# Patient Record
Sex: MALE | Race: WHITE | Employment: OTHER | URBAN - METROPOLITAN AREA
[De-identification: names, ages, dates, MRNs, and addresses within clinical notes are randomized per-mention and may not be internally consistent; named-entity substitution may affect disease eponyms.]

---

## 2017-09-29 ENCOUNTER — OFFICE VISIT (OUTPATIENT)
Dept: FAMILY MEDICINE CLINIC | Facility: CLINIC | Age: 57
End: 2017-09-29

## 2017-09-29 VITALS
HEART RATE: 104 BPM | HEIGHT: 71 IN | WEIGHT: 254 LBS | OXYGEN SATURATION: 96 % | RESPIRATION RATE: 18 BRPM | TEMPERATURE: 99 F | DIASTOLIC BLOOD PRESSURE: 90 MMHG | BODY MASS INDEX: 35.56 KG/M2 | SYSTOLIC BLOOD PRESSURE: 150 MMHG

## 2017-09-29 DIAGNOSIS — M25.512 ACUTE PAIN OF BOTH SHOULDERS: ICD-10-CM

## 2017-09-29 DIAGNOSIS — R07.9 CHEST PAIN IN ADULT: ICD-10-CM

## 2017-09-29 DIAGNOSIS — M25.511 ACUTE PAIN OF BOTH SHOULDERS: ICD-10-CM

## 2017-09-29 DIAGNOSIS — M54.50 ACUTE BILATERAL LOW BACK PAIN WITHOUT SCIATICA: ICD-10-CM

## 2017-09-29 DIAGNOSIS — S09.90XD HEAD TRAUMA, SUBSEQUENT ENCOUNTER: Primary | ICD-10-CM

## 2017-09-29 DIAGNOSIS — M54.2 NECK PAIN: ICD-10-CM

## 2017-09-29 DIAGNOSIS — M25.521 ELBOW PAIN, RIGHT: ICD-10-CM

## 2017-09-29 DIAGNOSIS — J45.20 MILD INTERMITTENT ASTHMA WITHOUT COMPLICATION: ICD-10-CM

## 2017-09-29 DIAGNOSIS — R07.0 THROAT PAIN IN ADULT: ICD-10-CM

## 2017-09-29 PROCEDURE — 99204 OFFICE O/P NEW MOD 45 MIN: CPT | Performed by: FAMILY MEDICINE

## 2017-09-29 RX ORDER — FLUTICASONE PROPIONATE AND SALMETEROL 100; 50 UG/1; UG/1
1 POWDER RESPIRATORY (INHALATION) 2 TIMES DAILY
COMMUNITY
End: 2017-09-29

## 2017-09-29 RX ORDER — FLUTICASONE PROPIONATE AND SALMETEROL 100; 50 UG/1; UG/1
1 POWDER RESPIRATORY (INHALATION) 2 TIMES DAILY
Qty: 60 EACH | Refills: 3 | Status: SHIPPED | OUTPATIENT
Start: 2017-09-29 | End: 2018-03-23
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
## 2017-11-14 ENCOUNTER — OFFICE VISIT (OUTPATIENT)
Dept: FAMILY MEDICINE CLINIC | Facility: CLINIC | Age: 57
End: 2017-11-14

## 2017-11-14 VITALS
SYSTOLIC BLOOD PRESSURE: 160 MMHG | HEART RATE: 102 BPM | HEIGHT: 71 IN | WEIGHT: 256 LBS | DIASTOLIC BLOOD PRESSURE: 80 MMHG | BODY MASS INDEX: 35.84 KG/M2 | OXYGEN SATURATION: 98 %

## 2017-11-14 DIAGNOSIS — S09.90XD CLOSED HEAD INJURY, SUBSEQUENT ENCOUNTER: Primary | ICD-10-CM

## 2017-11-14 DIAGNOSIS — M25.511 CHRONIC PAIN OF BOTH SHOULDERS: ICD-10-CM

## 2017-11-14 DIAGNOSIS — M54.50 CHRONIC BILATERAL LOW BACK PAIN WITHOUT SCIATICA: ICD-10-CM

## 2017-11-14 DIAGNOSIS — M54.2 NECK PAIN: ICD-10-CM

## 2017-11-14 DIAGNOSIS — M25.512 CHRONIC PAIN OF BOTH SHOULDERS: ICD-10-CM

## 2017-11-14 DIAGNOSIS — G89.29 CHRONIC PAIN OF BOTH SHOULDERS: ICD-10-CM

## 2017-11-14 DIAGNOSIS — G89.29 CHRONIC CHEST WALL PAIN: ICD-10-CM

## 2017-11-14 DIAGNOSIS — R03.0 ELEVATED BLOOD PRESSURE READING WITHOUT DIAGNOSIS OF HYPERTENSION: ICD-10-CM

## 2017-11-14 DIAGNOSIS — R07.89 CHRONIC CHEST WALL PAIN: ICD-10-CM

## 2017-11-14 DIAGNOSIS — M25.521 RIGHT ELBOW PAIN: ICD-10-CM

## 2017-11-14 DIAGNOSIS — G89.29 CHRONIC BILATERAL LOW BACK PAIN WITHOUT SCIATICA: ICD-10-CM

## 2017-11-14 PROCEDURE — 99213 OFFICE O/P EST LOW 20 MIN: CPT | Performed by: FAMILY MEDICINE

## 2017-11-14 RX ORDER — LITHIUM CARBONATE 300 MG/1
300 TABLET, FILM COATED, EXTENDED RELEASE ORAL
Refills: 2 | COMMUNITY
Start: 2017-09-21
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
## 2017-11-16 NOTE — TELEPHONE ENCOUNTER
Error on first note, Ct scan referral to be extended, not MRI. Rona Morfin notified that pt would like the referral for CT to be extended to Dec 17. Pt notified.

## 2017-11-16 NOTE — TELEPHONE ENCOUNTER
Yamilka Johnston with [facility] notified that pt is unable to go in for MRI until after the holidays, and that a new order was placed by Dr. Andrew Amezcua. Raegan Morrison will restart the approval process. Left message for pt regarding this and to call back if questions.

## 2017-11-16 NOTE — TELEPHONE ENCOUNTER
Please extend order for pt. Pt states mri will  tomorrow and is unable to go in until after holiday. pt is requesting to speak with Dr. Roberto Pederson. Please fax to [facility].

## 2017-12-04 NOTE — TELEPHONE ENCOUNTER
Patient left message on service asking for orders for physical therapy and also need doctor signature for paper work.

## 2017-12-06 NOTE — TELEPHONE ENCOUNTER
Please fax referral to ENT Dr. Cecil Keenan at [facility]. Pt has appointment for December 21 at 10:45 am fax # [phone] clinic D ENT office.      Also left message with service regarding referral if we can please change order from occupational th

## 2017-12-07 NOTE — TELEPHONE ENCOUNTER
Changed ENT referral to [facility] per patient request. Referral pending for authorization. Faxed occupational therapy and physical therapy to [address] with fax again.

## 2017-12-08 NOTE — TELEPHONE ENCOUNTER
CALLING NEED REFERRALS FOR CT SCAN AND ENT TO BE EXTENDED TO LATE MARCH EARLY April DUE TO HEALTH CONDITION CANT BE OUT IN WEATHER. PLEASE CALL WHEN COMPLETE.

## 2017-12-13 NOTE — TELEPHONE ENCOUNTER
Referral order prescription to get a ENT exam for a Dr. Elida Gamboa and he is with the [facility].

## 2018-01-26 ENCOUNTER — TELEPHONE (OUTPATIENT)
Dept: OBGYN CLINIC | Facility: CLINIC | Age: 58
End: 2018-01-26

## 2018-01-26 NOTE — TELEPHONE ENCOUNTER
Dimitri Decker is calling for a new physical therapy referral. States [facility]. Requesting right elbow, neck, lumbar back and bilateral chest. Please mail referral to pt.

## 2018-02-02 ENCOUNTER — TELEPHONE (OUTPATIENT)
Dept: FAMILY MEDICINE CLINIC | Facility: CLINIC | Age: 58
End: 2018-02-02

## 2018-02-02 DIAGNOSIS — M99.08: ICD-10-CM

## 2018-02-02 DIAGNOSIS — M54.50 CHRONIC BILATERAL LOW BACK PAIN WITHOUT SCIATICA: ICD-10-CM

## 2018-02-02 DIAGNOSIS — G89.29 CHRONIC BILATERAL LOW BACK PAIN WITHOUT SCIATICA: ICD-10-CM

## 2018-02-02 DIAGNOSIS — M54.2 NECK PAIN: ICD-10-CM

## 2018-02-02 DIAGNOSIS — M25.521 RIGHT ELBOW PAIN: Primary | ICD-10-CM

## 2018-02-02 NOTE — TELEPHONE ENCOUNTER
Patient is requesting to go to [facility]. Requesting right elbow, neck, lumbar back and bilateral chest. Please mail referral to pt.

## 2018-02-07 RX ORDER — AZELASTINE HYDROCHLORIDE 0.5 MG/ML
1 SOLUTION/ DROPS OPHTHALMIC 2 TIMES DAILY
Qty: 1 BOTTLE | Refills: 2 | Status: SHIPPED | OUTPATIENT
Start: 2018-02-07 | End: 2018-02-16

## 2018-02-07 NOTE — TELEPHONE ENCOUNTER
Requesting [facility] on alistine [facility] in [place]. Pt also upset no one returned his call regarding his referral, states he also requested to have referral mailed to his house and never received it.

## 2018-02-16 ENCOUNTER — TELEPHONE (OUTPATIENT)
Dept: FAMILY MEDICINE CLINIC | Facility: CLINIC | Age: 58
End: 2018-02-16

## 2018-02-16 DIAGNOSIS — M54.2 BILATERAL NECK PAIN: ICD-10-CM

## 2018-02-16 DIAGNOSIS — M25.511 CHRONIC PAIN OF BOTH SHOULDERS: ICD-10-CM

## 2018-02-16 DIAGNOSIS — M54.50 CHRONIC BILATERAL LOW BACK PAIN WITHOUT SCIATICA: ICD-10-CM

## 2018-02-16 DIAGNOSIS — G89.29 CHRONIC BILATERAL LOW BACK PAIN WITHOUT SCIATICA: ICD-10-CM

## 2018-02-16 DIAGNOSIS — G89.29 CHRONIC PAIN OF BOTH SHOULDERS: ICD-10-CM

## 2018-02-16 DIAGNOSIS — M25.512 CHRONIC PAIN OF BOTH SHOULDERS: ICD-10-CM

## 2018-02-16 DIAGNOSIS — M25.521 RIGHT ELBOW PAIN: ICD-10-CM

## 2018-02-16 DIAGNOSIS — M99.08: Primary | ICD-10-CM

## 2018-02-16 RX ORDER — AZELASTINE HYDROCHLORIDE 0.5 MG/ML
1 SOLUTION/ DROPS OPHTHALMIC 2 TIMES DAILY
Qty: 5 BOTTLE | Refills: 2 | Status: SHIPPED | OUTPATIENT
Start: 2018-02-16 | End: 2018-03-23

## 2018-02-16 NOTE — TELEPHONE ENCOUNTER
PATIENT WANTS TO  ORDER FOR PHYSICAL THERAPY TODAY.   ALSO WANTS ALLERGY DROP CALLED AZELASTINE MORE THAN ONLY 1.

## 2018-02-20 NOTE — TELEPHONE ENCOUNTER
Harish calling from [facility] to give Dr. Nasima Rajan an Vazquez  that Mr. Kermit Duncan has been discharge from their practice for erratic behavior. Matt Dry state Michela Steel cussed at the front staff and requested a young female therapist.Harish also said this was Damián's seco

## 2018-03-14 ENCOUNTER — TELEPHONE (OUTPATIENT)
Dept: FAMILY MEDICINE CLINIC | Facility: CLINIC | Age: 58
End: 2018-03-14

## 2018-03-14 NOTE — TELEPHONE ENCOUNTER
Patient needs to schedule visit with me and since it is not urgent (food poisoning was from January), can schedule at open slot next week.

## 2018-03-14 NOTE — TELEPHONE ENCOUNTER
Would like an appointment with you so they can draw blood to see how bad the food poisoning was. It seems like every time he eats he has problems with pain for diarrhea.   Also the last time he was at [facility] and why did he only get 5 bottles and wants t

## 2018-03-19 NOTE — TELEPHONE ENCOUNTER
Patient calling again about gloves, masks and eye drops.   Appointment made for Dr. Jenniffer Chavez

## 2018-03-20 ENCOUNTER — TELEPHONE (OUTPATIENT)
Dept: FAMILY MEDICINE CLINIC | Facility: CLINIC | Age: 58
End: 2018-03-20

## 2018-03-20 DIAGNOSIS — IMO0001: Primary | ICD-10-CM

## 2018-03-20 NOTE — TELEPHONE ENCOUNTER
Spoke to patient and notified him that we had sent in an order for gloves and face mask to [facility] last week and orders were denied by his insurance. Patient became upset and states he does not use [facility] at all and only uses [facility].  Informed him jonathan

## 2018-03-20 NOTE — TELEPHONE ENCOUNTER
regarding asthma mask and gloves. Mr. Robert Blank refuses to speak with [facility].

## 2018-03-22 NOTE — TELEPHONE ENCOUNTER
Pt states his gloves, mask and eye drops have not been sent to his [facility] pharmacy. Pt also states he spoke with Nikos Neely and she stated Dr. Cara Cooper would call him back and she has not called. Pt is requesting to personally speak with Dr. Cara Cooper. [phone].

## 2018-03-23 ENCOUNTER — TELEPHONE (OUTPATIENT)
Dept: FAMILY MEDICINE CLINIC | Facility: CLINIC | Age: 58
End: 2018-03-23

## 2018-03-23 RX ORDER — AZELASTINE HYDROCHLORIDE 0.5 MG/ML
1 SOLUTION/ DROPS OPHTHALMIC 2 TIMES DAILY
Qty: 10 BOTTLE | Refills: 2 | Status: SHIPPED | OUTPATIENT
Start: 2018-03-23 | End: 2018-09-05

## 2018-03-23 RX ORDER — FLUTICASONE PROPIONATE AND SALMETEROL 100; 50 UG/1; UG/1
1 POWDER RESPIRATORY (INHALATION) 2 TIMES DAILY
Qty: 120 EACH | Refills: 3 | Status: SHIPPED | OUTPATIENT
Start: 2018-03-23 | End: 2018-04-22

## 2018-03-23 NOTE — TELEPHONE ENCOUNTER
Listened to patient for 15 minutes regarding his request for the gloves, mask, and eye drops, which were already sent to [facility] in [place] as requested this morning.  He wanted to know if insurance would approve it and told him we have not gotten any prio

## 2018-03-23 NOTE — TELEPHONE ENCOUNTER
Patient called back and [facility] does not supply gloves and masks. Patient would like to  some gloves from office when come in. I suggested that he goes to the [employer] store.

## 2018-04-02 ENCOUNTER — APPOINTMENT (OUTPATIENT)
Dept: LAB | Facility: REFERENCE LAB | Age: 58
End: 2018-04-02
Attending: FAMILY MEDICINE
Payer: MEDICARE

## 2018-04-02 DIAGNOSIS — IMO0001: ICD-10-CM

## 2018-04-02 PROCEDURE — 36415 COLL VENOUS BLD VENIPUNCTURE: CPT | Performed by: FAMILY MEDICINE

## 2018-04-02 PROCEDURE — 80053 COMPREHEN METABOLIC PANEL: CPT | Performed by: FAMILY MEDICINE

## 2018-04-09 ENCOUNTER — OFFICE VISIT (OUTPATIENT)
Dept: FAMILY MEDICINE CLINIC | Facility: CLINIC | Age: 58
End: 2018-04-09

## 2018-04-09 VITALS
BODY MASS INDEX: 36.26 KG/M2 | SYSTOLIC BLOOD PRESSURE: 138 MMHG | WEIGHT: 259 LBS | HEART RATE: 100 BPM | HEIGHT: 71 IN | OXYGEN SATURATION: 98 % | DIASTOLIC BLOOD PRESSURE: 80 MMHG

## 2018-04-09 DIAGNOSIS — Z71.1 PERSON WITH FEARED COMPLAINT IN WHOM NO DIAGNOSIS IS MADE: Primary | ICD-10-CM

## 2018-04-09 PROCEDURE — 99213 OFFICE O/P EST LOW 20 MIN: CPT | Performed by: FAMILY MEDICINE

## 2018-04-09 NOTE — PROGRESS NOTES
CC:  Patient presents with:  Medication Request: gloves and mask      HPI: 62year old male here requesting gloves and masks. Requesting stool testing and STD screening for history of diarrhea in 2017.    He continues to fixate on his need for gloves and lungs 2 (two) times daily. Disp: 120 each Rfl: 3   VENTOLIN  (90 Base) MCG/ACT Inhalation Aero Soln TAKE 1 PUFF ONE TIME AS NEEDED FOR WHEEZING Disp: 1 Inhaler Rfl: 0   Lithium Carbonate  MG Oral Tab CR Take 300 mg by mouth once daily.  Disp:

## 2018-06-22 PROBLEM — Z85.3 HISTORY OF BREAST CANCER IN MALE: Status: ACTIVE | Noted: 2018-06-22

## 2018-06-28 PROBLEM — F31.9 BIPOLAR DISORDER (HCC): Status: ACTIVE | Noted: 2018-06-08
